# Patient Record
Sex: FEMALE | Race: WHITE | NOT HISPANIC OR LATINO
[De-identification: names, ages, dates, MRNs, and addresses within clinical notes are randomized per-mention and may not be internally consistent; named-entity substitution may affect disease eponyms.]

---

## 2022-02-25 ENCOUNTER — APPOINTMENT (OUTPATIENT)
Dept: OTOLARYNGOLOGY | Facility: CLINIC | Age: 27
End: 2022-02-25
Payer: COMMERCIAL

## 2022-02-25 VITALS
WEIGHT: 150 LBS | SYSTOLIC BLOOD PRESSURE: 148 MMHG | HEART RATE: 104 BPM | DIASTOLIC BLOOD PRESSURE: 93 MMHG | BODY MASS INDEX: 28.32 KG/M2 | HEIGHT: 61 IN

## 2022-02-25 DIAGNOSIS — K11.20 SIALOADENITIS, UNSPECIFIED: ICD-10-CM

## 2022-02-25 PROBLEM — Z00.00 ENCOUNTER FOR PREVENTIVE HEALTH EXAMINATION: Status: ACTIVE | Noted: 2022-02-25

## 2022-02-25 PROCEDURE — 99203 OFFICE O/P NEW LOW 30 MIN: CPT

## 2022-02-25 NOTE — REVIEW OF SYSTEMS
[Seasonal Allergies] : seasonal allergies [Swelling Neck] : swelling neck [Swelling Face] : face swelling [Negative] : Heme/Lymph [Patient Intake Form Reviewed] : Patient intake form was reviewed

## 2022-02-25 NOTE — ASSESSMENT
[FreeTextEntry1] : SALIVARY DUCT SAND/ STONE OR MUCOUS PLAGUE DISCUSSED\par US \par WATER INTAKE TO BE INCREASED\par LEMON DROPS\par F/U AFTER US

## 2022-02-25 NOTE — HISTORY OF PRESENT ILLNESS
[de-identified] : RECURRENT RIGHT PAROTID SWELLING THAT GETS BETTER SPONTANEOUSLY FOR THE PAST FEW DAYS\par ASYMPTOMATIC AT THE TIME OF EXAM

## 2022-02-25 NOTE — REASON FOR VISIT
[Initial Consultation] : an initial consultation for [FreeTextEntry2] : facial swelling and ear pain

## 2022-03-01 ENCOUNTER — APPOINTMENT (OUTPATIENT)
Dept: OTOLARYNGOLOGY | Facility: CLINIC | Age: 27
End: 2022-03-01
Payer: COMMERCIAL

## 2022-03-01 VITALS
HEART RATE: 77 BPM | DIASTOLIC BLOOD PRESSURE: 81 MMHG | WEIGHT: 150 LBS | BODY MASS INDEX: 28.32 KG/M2 | SYSTOLIC BLOOD PRESSURE: 134 MMHG | HEIGHT: 61 IN

## 2022-03-01 DIAGNOSIS — R60.9 EDEMA, UNSPECIFIED: ICD-10-CM

## 2022-03-01 PROCEDURE — 99213 OFFICE O/P EST LOW 20 MIN: CPT

## 2022-03-01 NOTE — HISTORY OF PRESENT ILLNESS
[de-identified] : RECURRENT RIGHT PAROTID SWELLING THAT GETS BETTER SPONTANEOUSLY FOR THE PAST FEW DAYS\par RIGHT PAROTID SWELLING AGAIN FOR THE PAST FEW DAYS\par US DONE

## 2022-03-01 NOTE — ASSESSMENT
[FreeTextEntry1] : MRI\par DENTAL EXAM DISCUSSED\par AUGMENTING IF INCREASED TENDERNESS AND SWELLING\par LEMON DROPS\par HYDRATION\par F/U AFTER MRI

## 2022-03-01 NOTE — PHYSICAL EXAM
[de-identified] : RIGHT PAROTID SLIGHT SWELLING / NO MASS NOTED OR PALPABLE [Normal] : mucosa is normal [Midline] : trachea located in midline position

## 2022-03-23 DIAGNOSIS — K11.8 OTHER DISEASES OF SALIVARY GLANDS: ICD-10-CM

## 2022-03-28 ENCOUNTER — RESULT REVIEW (OUTPATIENT)
Age: 27
End: 2022-03-28

## 2023-10-17 ENCOUNTER — APPOINTMENT (OUTPATIENT)
Dept: ORTHOPEDIC SURGERY | Facility: CLINIC | Age: 28
End: 2023-10-17
Payer: COMMERCIAL

## 2023-10-17 VITALS — HEIGHT: 61 IN | BODY MASS INDEX: 28.32 KG/M2 | WEIGHT: 150 LBS

## 2023-10-17 DIAGNOSIS — Z78.9 OTHER SPECIFIED HEALTH STATUS: ICD-10-CM

## 2023-10-17 DIAGNOSIS — M25.371 OTHER INSTABILITY, RIGHT ANKLE: ICD-10-CM

## 2023-10-17 PROCEDURE — 99203 OFFICE O/P NEW LOW 30 MIN: CPT

## 2023-10-17 PROCEDURE — 73610 X-RAY EXAM OF ANKLE: CPT | Mod: RT

## 2023-10-18 ENCOUNTER — TRANSCRIPTION ENCOUNTER (OUTPATIENT)
Age: 28
End: 2023-10-18